# Patient Record
Sex: FEMALE | Race: BLACK OR AFRICAN AMERICAN | NOT HISPANIC OR LATINO | Employment: FULL TIME | URBAN - METROPOLITAN AREA
[De-identification: names, ages, dates, MRNs, and addresses within clinical notes are randomized per-mention and may not be internally consistent; named-entity substitution may affect disease eponyms.]

---

## 2023-04-24 PROBLEM — E78.5 DYSLIPIDEMIA: Status: ACTIVE | Noted: 2023-04-24

## 2023-04-24 PROBLEM — E55.9 VITAMIN D DEFICIENCY: Status: ACTIVE | Noted: 2023-04-24

## 2023-04-24 PROBLEM — M75.81 RIGHT ROTATOR CUFF TENDINITIS: Status: ACTIVE | Noted: 2023-04-24

## 2023-04-24 PROBLEM — R00.1 BRADYCARDIA: Status: ACTIVE | Noted: 2023-04-24

## 2023-04-24 PROBLEM — M85.80 OSTEOPENIA: Status: ACTIVE | Noted: 2023-04-24

## 2023-04-24 RX ORDER — ASPIRIN 325 MG
1 TABLET, DELAYED RELEASE (ENTERIC COATED) ORAL
COMMUNITY
Start: 2016-03-29

## 2023-04-24 NOTE — PROGRESS NOTES
Subjective   Naya Steiner is a 67 y.o. female history of dyslipidemia osteopenia vitamin D deficiency who presents for No chief complaint on file..  HPI  Patient is here for follow-up fasting well voices no major medical pain denies chest pain shortness of breath fever chills nausea vomiting,  Review of Systems  10 system review pertinent as above  Objective     Visit Vitals  /80   Pulse 74   Temp 36.4 °C (97.5 °F)   Resp 16      Physical Exam  HEENT: Atraumatic normocephalic the pupils are equal and round and reactive to light the sclerae nonicteric extraocular motion are intact.  Neck: Is supple without JVD no carotid bruits the trachea is midline there are no masses pulses are equal and bilateral with normal upstroke.  Skin: Normal.  Skin good texture.  Moist.  Good turgor.  No lesions, no rashes.  Lymph: No lymphadenopathy appreciated, no masses, no lesions  Lungs: Are clear to auscultation and percussion, good breath sounds bilaterally, no rhonchi, no wheezing, good diaphragmatic excursion.  Heart: Normal rate and normal rhythm S1, S2, no S3, no gallop, murmur or rub.  Abdomen: Soft, nontender, no organomegaly, good bowel sounds.    Extremities: Full range of motion, good pulses bilateral.  No cyanosis, no clubbing or edema.  Neuro: Cranial nerves II-XII are grossly intact there is no sensory or motor deficits.  Able to move all extremities.      Assessment/Plan     Patient is here for follow-up, fasting blood work    CBC BMP lipids AST ALT vitamin D 25-hydroxy    Continue with the low-fat, low-cholesterol diet,  I recommended Mediterranean diet, which include fish, chicken, vegetables and olive oil  Exercise daily for 30 minutes at least 3 times a week  Continue home medications    Tobacco abuse   Discussed smoking secession    Cardiac calcium   Stop smoking   Diet exercise    Vitamin D deficiency  D3 2000 units daily    History of osteopenia  Vitamin D calcium  Regular exercise  Problem List Items  Addressed This Visit          Circulatory    Bradycardia - Primary       Musculoskeletal    Right rotator cuff tendinitis    Osteopenia       Endocrine/Metabolic    Vitamin D deficiency       Other    Dyslipidemia     Other Visit Diagnoses       Screening mammogram, encounter for        Relevant Orders    BI mammo left diagnostic              Trell Sprague MD

## 2023-04-26 ENCOUNTER — OFFICE VISIT (OUTPATIENT)
Dept: PRIMARY CARE | Facility: CLINIC | Age: 68
End: 2023-04-26
Payer: COMMERCIAL

## 2023-04-26 VITALS
SYSTOLIC BLOOD PRESSURE: 122 MMHG | TEMPERATURE: 97.5 F | HEIGHT: 61 IN | RESPIRATION RATE: 16 BRPM | DIASTOLIC BLOOD PRESSURE: 80 MMHG | BODY MASS INDEX: 27.94 KG/M2 | HEART RATE: 74 BPM | WEIGHT: 148 LBS

## 2023-04-26 DIAGNOSIS — Z12.31 SCREENING MAMMOGRAM, ENCOUNTER FOR: ICD-10-CM

## 2023-04-26 DIAGNOSIS — M75.81 RIGHT ROTATOR CUFF TENDINITIS: ICD-10-CM

## 2023-04-26 DIAGNOSIS — E55.9 VITAMIN D DEFICIENCY: ICD-10-CM

## 2023-04-26 DIAGNOSIS — M85.80 OSTEOPENIA, UNSPECIFIED LOCATION: ICD-10-CM

## 2023-04-26 DIAGNOSIS — E78.5 DYSLIPIDEMIA: ICD-10-CM

## 2023-04-26 DIAGNOSIS — Z72.0 TOBACCO ABUSE: ICD-10-CM

## 2023-04-26 DIAGNOSIS — R00.1 BRADYCARDIA: Primary | ICD-10-CM

## 2023-04-26 PROCEDURE — 1159F MED LIST DOCD IN RCRD: CPT | Performed by: INTERNAL MEDICINE

## 2023-04-26 PROCEDURE — 80061 LIPID PANEL: CPT | Performed by: INTERNAL MEDICINE

## 2023-04-26 PROCEDURE — 99214 OFFICE O/P EST MOD 30 MIN: CPT | Performed by: INTERNAL MEDICINE

## 2023-04-26 PROCEDURE — 4004F PT TOBACCO SCREEN RCVD TLK: CPT | Performed by: INTERNAL MEDICINE

## 2023-04-26 PROCEDURE — 84460 ALANINE AMINO (ALT) (SGPT): CPT | Performed by: INTERNAL MEDICINE

## 2023-04-26 PROCEDURE — 85025 COMPLETE CBC W/AUTO DIFF WBC: CPT | Performed by: INTERNAL MEDICINE

## 2023-04-26 PROCEDURE — 84450 TRANSFERASE (AST) (SGOT): CPT | Performed by: INTERNAL MEDICINE

## 2023-04-26 PROCEDURE — 82306 VITAMIN D 25 HYDROXY: CPT | Performed by: INTERNAL MEDICINE

## 2023-04-26 PROCEDURE — 80048 BASIC METABOLIC PNL TOTAL CA: CPT | Performed by: INTERNAL MEDICINE

## 2023-04-26 ASSESSMENT — PAIN SCALES - GENERAL: PAINLEVEL: 0-NO PAIN

## 2023-04-28 DIAGNOSIS — Z72.0 TOBACCO ABUSE: ICD-10-CM

## 2023-04-28 DIAGNOSIS — E78.5 DYSLIPIDEMIA: Primary | ICD-10-CM

## 2023-04-28 RX ORDER — ROSUVASTATIN CALCIUM 20 MG/1
20 TABLET, COATED ORAL DAILY
Qty: 30 TABLET | Refills: 5 | Status: SHIPPED | OUTPATIENT
Start: 2023-04-28 | End: 2024-03-13 | Stop reason: SDUPTHER

## 2023-05-08 ENCOUNTER — TELEPHONE (OUTPATIENT)
Dept: PRIMARY CARE | Facility: CLINIC | Age: 68
End: 2023-05-08
Payer: COMMERCIAL

## 2024-03-07 NOTE — PROGRESS NOTES
Subjective   Naya Steiner is a 68 y.o. female  who presents for follow-up and Medicare wellness.  HPI  Sola is 68 numbness of dyslipidemia osteopenia vitamin D insufficiency patient is here for follow-up fasting well voices no major medical pain denies chest pain shortness of breath fever chills nausea vomiting, patient is here for Medicare wellness as well as follow-up exam, she was no major medical manage takes medication prescribed voices no major minor complaint denies chest pain shortness of breath fever chills nausea vomiting constipation diarrhea this urgency frequency.  Review of Systems  10 system review pertinent as above  Objective     Visit Vitals  /80   Pulse 78   Temp 36.6 °C (97.9 °F)   Resp 16      Physical Exam  HEENT: Atraumatic normocephalic the pupils are equal and round and reactive to light the sclerae nonicteric extraocular motion are intact.  Neck: Is supple without JVD no carotid bruits the trachea is midline there are no masses pulses are equal and bilateral with normal upstroke.  Skin: Normal.  Skin good texture.  Moist.  Good turgor.  No lesions, no rashes.  Lymph: No lymphadenopathy appreciated, no masses, no lesions  Lungs: Are clear to auscultation and percussion, good breath sounds bilaterally, no rhonchi, no wheezing, good diaphragmatic excursion.  Heart: Normal rate and normal rhythm S1, S2, no S3, no gallop, murmur or rub.  Abdomen: Soft, nontender, no organomegaly, good bowel sounds.    Extremities: Full range of motion, good pulses bilateral.  No cyanosis, no clubbing or edema.  Neuro: Cranial nerves II-XII are grossly intact there is no sensory or motor deficits.  Able to move all extremities.      Assessment/Plan     Patient is here for follow-up, fasting blood work    CBC BMP lipids AST ALT vitamin D 25-hydroxy    Immunization  Flu declined  Pneumovax Declined  Corona Declined    Colonoscopy  06/06/2022  Mammogram Req given  03/13/2024  DEXA  CACS Score 04/2022  LM  65.96  LAD 44.5 v    Cardiology Referral  Dr Jordy montenegro    Continue with the low-fat, low-cholesterol diet,  I recommended Mediterranean diet, which include fish, chicken, vegetables and olive oil  Exercise daily for 30 minutes at least 3 times a week  Continue home medications  Rosuvastatin does not take  I spoke with patient about restarting    Tobacco abuse   Discussed smoking secession  Patient is high risk both of Lung cancer and CAD  Shared decision made patient is ready to proceed   With low dose CT chest    Vitamin D deficiency  D3 2000 units daily    History of osteopenia  Vitamin D calcium  Regular exercise  Problem List Items Addressed This Visit       Dyslipidemia    Relevant Orders    Referral to Cardiology    Basic Metabolic Panel    Lipid Panel    AST    ALT     Other Visit Diagnoses       Screening mammogram for breast cancer    -  Primary    Relevant Orders    BI mammo bilateral screening tomosynthesis    Basic Metabolic Panel    Elevated coronary artery calcium score        Relevant Orders    Referral to Cardiology    CBC    Tobacco abuse        Relevant Orders    Referral to Cardiology    CBC    Basic Metabolic Panel    Vitamin D insufficiency        Relevant Orders    Vitamin D 25-Hydroxy,Total (for eval of Vitamin D levels)            Trell Sprague MD

## 2024-03-13 ENCOUNTER — OFFICE VISIT (OUTPATIENT)
Dept: PRIMARY CARE | Facility: CLINIC | Age: 69
End: 2024-03-13
Payer: MEDICARE

## 2024-03-13 VITALS
BODY MASS INDEX: 27.38 KG/M2 | HEIGHT: 61 IN | SYSTOLIC BLOOD PRESSURE: 122 MMHG | TEMPERATURE: 97.9 F | WEIGHT: 145 LBS | HEART RATE: 78 BPM | DIASTOLIC BLOOD PRESSURE: 80 MMHG | RESPIRATION RATE: 16 BRPM

## 2024-03-13 DIAGNOSIS — Z12.2 SCREENING FOR LUNG CANCER: ICD-10-CM

## 2024-03-13 DIAGNOSIS — F17.210 CIGARETTE SMOKER: ICD-10-CM

## 2024-03-13 DIAGNOSIS — E55.9 VITAMIN D INSUFFICIENCY: ICD-10-CM

## 2024-03-13 DIAGNOSIS — Z72.0 TOBACCO ABUSE: ICD-10-CM

## 2024-03-13 DIAGNOSIS — Z00.00 ROUTINE GENERAL MEDICAL EXAMINATION AT HEALTH CARE FACILITY: ICD-10-CM

## 2024-03-13 DIAGNOSIS — E78.5 DYSLIPIDEMIA: ICD-10-CM

## 2024-03-13 DIAGNOSIS — R93.1 ELEVATED CORONARY ARTERY CALCIUM SCORE: ICD-10-CM

## 2024-03-13 DIAGNOSIS — Z12.31 SCREENING MAMMOGRAM FOR BREAST CANCER: Primary | ICD-10-CM

## 2024-03-13 PROCEDURE — 99214 OFFICE O/P EST MOD 30 MIN: CPT | Performed by: INTERNAL MEDICINE

## 2024-03-13 PROCEDURE — G0438 PPPS, INITIAL VISIT: HCPCS | Performed by: INTERNAL MEDICINE

## 2024-03-13 PROCEDURE — 84450 TRANSFERASE (AST) (SGOT): CPT | Performed by: INTERNAL MEDICINE

## 2024-03-13 PROCEDURE — 1126F AMNT PAIN NOTED NONE PRSNT: CPT | Performed by: INTERNAL MEDICINE

## 2024-03-13 PROCEDURE — 85025 COMPLETE CBC W/AUTO DIFF WBC: CPT | Performed by: INTERNAL MEDICINE

## 2024-03-13 PROCEDURE — 1159F MED LIST DOCD IN RCRD: CPT | Performed by: INTERNAL MEDICINE

## 2024-03-13 PROCEDURE — 82306 VITAMIN D 25 HYDROXY: CPT | Performed by: INTERNAL MEDICINE

## 2024-03-13 PROCEDURE — 84460 ALANINE AMINO (ALT) (SGPT): CPT | Performed by: INTERNAL MEDICINE

## 2024-03-13 PROCEDURE — 80061 LIPID PANEL: CPT | Performed by: INTERNAL MEDICINE

## 2024-03-13 PROCEDURE — 4004F PT TOBACCO SCREEN RCVD TLK: CPT | Performed by: INTERNAL MEDICINE

## 2024-03-13 PROCEDURE — 1170F FXNL STATUS ASSESSED: CPT | Performed by: INTERNAL MEDICINE

## 2024-03-13 PROCEDURE — 80048 BASIC METABOLIC PNL TOTAL CA: CPT | Performed by: INTERNAL MEDICINE

## 2024-03-13 RX ORDER — ROSUVASTATIN CALCIUM 20 MG/1
20 TABLET, COATED ORAL DAILY
Qty: 30 TABLET | Refills: 5 | Status: SHIPPED | OUTPATIENT
Start: 2024-03-13 | End: 2024-09-09

## 2024-03-13 RX ORDER — ASPIRIN 81 MG/1
81 TABLET ORAL DAILY
Qty: 30 TABLET | Refills: 11
Start: 2024-03-13 | End: 2025-03-13

## 2024-03-13 ASSESSMENT — ACTIVITIES OF DAILY LIVING (ADL)
GROCERY SHOPPING: INDEPENDENT
GROOMING: INDEPENDENT
HEARING - RIGHT EAR: FUNCTIONAL
NEEDS ASSISTANCE WITH FOOD: INDEPENDENT
MANAGING FINANCES: INDEPENDENT
TAKING MEDICATION: INDEPENDENT
ADEQUATE_TO_COMPLETE_ADL: YES
BATHING: INDEPENDENT
PREPARING MEALS: INDEPENDENT
USING TELEPHONE: INDEPENDENT
DOING HOUSEWORK: INDEPENDENT
HEARING - LEFT EAR: FUNCTIONAL
STIL DRIVING: YES
USING TRANSPORTATION: INDEPENDENT
EATING: INDEPENDENT
TOILETING: INDEPENDENT
JUDGMENT_ADEQUATE_SAFELY_COMPLETE_DAILY_ACTIVITIES: YES
DRESSING YOURSELF: INDEPENDENT
PILL BOX USED: NO
PATIENT'S MEMORY ADEQUATE TO SAFELY COMPLETE DAILY ACTIVITIES?: YES
FEEDING YOURSELF: INDEPENDENT
WALKS IN HOME: INDEPENDENT

## 2024-03-13 ASSESSMENT — ANXIETY QUESTIONNAIRES
2. NOT BEING ABLE TO STOP OR CONTROL WORRYING: NOT AT ALL
3. WORRYING TOO MUCH ABOUT DIFFERENT THINGS: NOT AT ALL
IF YOU CHECKED OFF ANY PROBLEMS ON THIS QUESTIONNAIRE, HOW DIFFICULT HAVE THESE PROBLEMS MADE IT FOR YOU TO DO YOUR WORK, TAKE CARE OF THINGS AT HOME, OR GET ALONG WITH OTHER PEOPLE: NOT DIFFICULT AT ALL
6. BECOMING EASILY ANNOYED OR IRRITABLE: NOT AT ALL
GAD7 TOTAL SCORE: 0
4. TROUBLE RELAXING: NOT AT ALL
7. FEELING AFRAID AS IF SOMETHING AWFUL MIGHT HAPPEN: NOT AT ALL
5. BEING SO RESTLESS THAT IT IS HARD TO SIT STILL: NOT AT ALL
1. FEELING NERVOUS, ANXIOUS, OR ON EDGE: NOT AT ALL

## 2024-03-13 ASSESSMENT — PATIENT HEALTH QUESTIONNAIRE - PHQ9
SUM OF ALL RESPONSES TO PHQ9 QUESTIONS 1 AND 2: 0
2. FEELING DOWN, DEPRESSED OR HOPELESS: NOT AT ALL
1. LITTLE INTEREST OR PLEASURE IN DOING THINGS: NOT AT ALL

## 2024-03-13 ASSESSMENT — PAIN SCALES - GENERAL: PAINLEVEL: 0-NO PAIN

## 2024-03-13 ASSESSMENT — COLUMBIA-SUICIDE SEVERITY RATING SCALE - C-SSRS
1. IN THE PAST MONTH, HAVE YOU WISHED YOU WERE DEAD OR WISHED YOU COULD GO TO SLEEP AND NOT WAKE UP?: NO
2. HAVE YOU ACTUALLY HAD ANY THOUGHTS OF KILLING YOURSELF?: NO
6. HAVE YOU EVER DONE ANYTHING, STARTED TO DO ANYTHING, OR PREPARED TO DO ANYTHING TO END YOUR LIFE?: NO

## 2024-03-13 ASSESSMENT — GERIATRIC MINI NUTRITIONAL ASSESSMENT (MNA)
C GENERAL MOBILITY: GOES OUT
B WEIGHT LOSS DURING THE LAST 3 MONTHS: NO WEIGHT LOSS
A HAS FOOD INTAKE DECLINED OVER THE PAST 3 MONTHS DUE TO LOSS OF APPETITE, DIGESTIVE PROBLEMS, CHEWING OR SWALLOWING DIFFICULTIES?: NO DECREASE IN FOOD INTAKE

## 2024-03-13 ASSESSMENT — ENCOUNTER SYMPTOMS
LOSS OF SENSATION IN FEET: 0
OCCASIONAL FEELINGS OF UNSTEADINESS: 0
DEPRESSION: 0

## 2024-03-13 NOTE — PROGRESS NOTES
"Subjective   Reason for Visit: Naya Steiner is an 68 y.o. female here for a Medicare Wellness visit. Reviewed all medications by prescribing practitioner or clinical pharmacist (such as prescriptions, OTCs, herbal therapies and supplements) and documented in the medical record.    HPI  Naya 68 female with a history of tobacco abuse, osteopenia patient is here for Medicare wellness she lives independently at home voices no complaints she takes her medication as prescribed reports no falling.  Patient Care Team:  Trell Sprague MD as PCP - General  Trell Sprague MD as PCP - [employer] PCP  Trell Sprague MD [facility] PCP - [facility]     Review of Systems  10 system are pertinent as above  Objective   Vitals:    /80   Pulse 78   Temp 36.6 °C (97.9 °F)   Resp 16   Ht 1.549 m (5' 1\")   Wt 65.8 kg (145 lb)   BMI 27.40 kg/m²       Physical Exam  HEENT: Atraumatic normocephalic the pupils are equal and round and reactive to light the sclerae nonicteric extraocular motion are intact.  Neck: Is supple without JVD no carotid bruits the trachea is midline there are no masses pulses are equal and bilateral with normal upstroke.  Skin: Normal.  Skin good texture.  Moist.  Good turgor.  No lesions, no rashes.  Lymph: No lymphadenopathy appreciated, no masses, no lesions  Lungs: Are clear to auscultation and percussion, good breath sounds bilaterally, no rhonchi, no wheezing, good diaphragmatic excursion.  Heart: Normal rate and normal rhythm S1, S2, no S3, no gallop, murmur or rub.  Abdomen: Soft, nontender, no organomegaly, good bowel sounds.    Extremities: Full range of motion, good pulses bilateral.  No cyanosis, no clubbing or edema.  Neuro: Cranial nerves II-XII are grossly intact there is no sensory or motor deficits.  Able to move all extremities.  Assessment/Plan   Acute wellness  Problem List Items Addressed This Visit       Vitamin D insufficiency    Relevant Orders    Vitamin D 25-Hydroxy,Total " (for eval of Vitamin D levels)    Dyslipidemia    Relevant Medications    rosuvastatin (Crestor) 20 mg tablet    Other Relevant Orders    Referral to Cardiology    Basic Metabolic Panel    Lipid Panel    AST    ALT    Tobacco abuse    Relevant Orders    Referral to Cardiology    CBC    Basic Metabolic Panel    CT lung screening low dose    Screening mammogram for breast cancer - Primary    Relevant Orders    BI mammo bilateral screening tomosynthesis    Basic Metabolic Panel    CT lung screening low dose    Cigarette smoker    Relevant Orders    CT lung screening low dose    Elevated coronary artery calcium score    Relevant Medications    aspirin 81 mg EC tablet    Other Relevant Orders    Referral to Cardiology    CBC

## 2024-04-02 ENCOUNTER — HOSPITAL ENCOUNTER (OUTPATIENT)
Dept: RADIOLOGY | Facility: HOSPITAL | Age: 69
Discharge: HOME | End: 2024-04-02
Payer: MEDICARE

## 2024-04-02 DIAGNOSIS — Z12.31 SCREENING MAMMOGRAM FOR BREAST CANCER: ICD-10-CM

## 2024-04-02 DIAGNOSIS — Z12.2 SCREENING FOR LUNG CANCER: ICD-10-CM

## 2024-04-02 DIAGNOSIS — Z72.0 TOBACCO ABUSE: ICD-10-CM

## 2024-04-02 DIAGNOSIS — F17.210 CIGARETTE SMOKER: ICD-10-CM

## 2024-04-02 PROCEDURE — 71271 CT THORAX LUNG CANCER SCR C-: CPT

## 2024-04-15 PROBLEM — I10 BENIGN ESSENTIAL HYPERTENSION: Status: ACTIVE | Noted: 2024-04-15

## 2024-04-16 ENCOUNTER — OFFICE VISIT (OUTPATIENT)
Dept: CARDIOLOGY | Facility: CLINIC | Age: 69
End: 2024-04-16
Payer: MEDICARE

## 2024-04-16 VITALS
BODY MASS INDEX: 28.05 KG/M2 | WEIGHT: 148.6 LBS | HEIGHT: 61 IN | OXYGEN SATURATION: 96 % | HEART RATE: 48 BPM | SYSTOLIC BLOOD PRESSURE: 157 MMHG | DIASTOLIC BLOOD PRESSURE: 76 MMHG

## 2024-04-16 DIAGNOSIS — Z72.0 TOBACCO ABUSE: ICD-10-CM

## 2024-04-16 DIAGNOSIS — E78.5 DYSLIPIDEMIA: ICD-10-CM

## 2024-04-16 DIAGNOSIS — R06.09 DOE (DYSPNEA ON EXERTION): ICD-10-CM

## 2024-04-16 DIAGNOSIS — R93.1 ELEVATED CORONARY ARTERY CALCIUM SCORE: Primary | ICD-10-CM

## 2024-04-16 PROCEDURE — 93005 ELECTROCARDIOGRAM TRACING: CPT | Performed by: INTERNAL MEDICINE

## 2024-04-16 PROCEDURE — 99214 OFFICE O/P EST MOD 30 MIN: CPT | Mod: 25 | Performed by: INTERNAL MEDICINE

## 2024-04-16 RX ORDER — CHOLECALCIFEROL (VITAMIN D3) 50 MCG
50 TABLET ORAL EVERY OTHER DAY
COMMUNITY

## 2024-04-16 ASSESSMENT — ENCOUNTER SYMPTOMS
OCCASIONAL FEELINGS OF UNSTEADINESS: 0
LOSS OF SENSATION IN FEET: 0

## 2024-04-16 ASSESSMENT — COLUMBIA-SUICIDE SEVERITY RATING SCALE - C-SSRS
6. HAVE YOU EVER DONE ANYTHING, STARTED TO DO ANYTHING, OR PREPARED TO DO ANYTHING TO END YOUR LIFE?: NO
2. HAVE YOU ACTUALLY HAD ANY THOUGHTS OF KILLING YOURSELF?: NO
1. IN THE PAST MONTH, HAVE YOU WISHED YOU WERE DEAD OR WISHED YOU COULD GO TO SLEEP AND NOT WAKE UP?: NO

## 2024-04-16 ASSESSMENT — PATIENT HEALTH QUESTIONNAIRE - PHQ9
1. LITTLE INTEREST OR PLEASURE IN DOING THINGS: NOT AT ALL
2. FEELING DOWN, DEPRESSED OR HOPELESS: NOT AT ALL
SUM OF ALL RESPONSES TO PHQ9 QUESTIONS 1 AND 2: 0

## 2024-04-16 ASSESSMENT — PAIN SCALES - GENERAL: PAINLEVEL: 0-NO PAIN

## 2024-04-16 NOTE — PROGRESS NOTES
Primary Care Physician: Trell Sprague MD  Date of Visit: 04/16/2024 10:00 AM EDT  [place] [facility] [place] [address]     Chief Complaint:   No chief complaint on file.       HPI / Summary:   Naya Steiner is a 68 y.o. female presents new CV evaluation.  Referred Dr. Trell Sprague  April 2022 calcium score total of 110, left main 66, LAD 44.5    Still smoking, menthol ppd.    Etoh wine 4-6 glasses a week.  Worked at [employer], then [employer] as a .  Retired  Sx s/p hysterectomy.  No h/o lung liver, kidney disease.  No h/o blood clat, ca.    Family hx, Br 44 MI, M 62 MI.    2 pregnancies, 2 kids  No CP, no h/o MI, CVA.    Not taking statin.  Denies symptoms to suggest angina, heart failure endorses some exertional dyspnea denies claudication  Specialty Problems          Cardiology Problems    Bradycardia    Dyslipidemia    Cigarette smoker    Elevated coronary artery calcium score    Tobacco abuse    Benign essential hypertension     Comment on above: Added by Problem List Migration; 2013-12-13;             Past Medical History:   Diagnosis Date    Bradycardia, unspecified 05/09/2016    Bradycardia    Other conditions influencing health status 03/29/2016    Colonoscopy planned    Other shoulder lesions, right shoulder 12/21/2017    Right rotator cuff tendinitis    Personal history of other specified conditions 06/28/2016    History of fatigue    Tobacco abuse counseling 03/29/2016    Tobacco abuse counseling          No past surgical history on file.       Social History:   reports that she has been smoking cigarettes. She has been exposed to tobacco smoke. She has never used smokeless tobacco. She reports current alcohol use. She reports that she does not use drugs.      Allergies:  No Known Allergies    Outpatient Medications:  Current Outpatient Medications   Medication Instructions    aspirin 81 mg, oral, Daily    cholecalciferol (Vitamin D-3) 1,250 mcg (50,000 unit) capsule 1 capsule,  "oral, Once Weekly    rosuvastatin (CRESTOR) 20 mg, oral, Daily       ROS     Physical Exam:  There were no vitals filed for this visit.  Wt Readings from Last 5 Encounters:   03/13/24 65.8 kg (145 lb)   04/26/23 67.1 kg (148 lb)   01/25/23 68.9 kg (152 lb)   11/30/22 70.8 kg (156 lb)   04/07/22 68.9 kg (152 lb)     There is no height or weight on file to calculate BMI.     Well-developed well-nourished female in no acute distress  .  Flat .  Normal carotid upstrokes no bruits.  Prominent carotid pulsation the right neck flat JVP  Regular rate and rhythm without gallop or murmur.  Somewhat diminished apical breath sounds coarse breath sounds at the bases seem mildly hyperinflated.  Abdomen was soft and nontender without masses aneurysms or bruits.  No dependent edema with intact pedal pulses  Last Labs:  CMP:No results for input(s): \"NA\", \"K\", \"CL\", \"CO2\", \"ANIONGAP\", \"BUN\", \"CREATININE\", \"EGFR\", \"GLUCOSE\" in the last 95460 hours.No results for input(s): \"ALBUMIN\", \"ALKPHOS\", \"ALT\", \"AST\", \"BILITOT\", \"LIPASE\" in the last 43802 hours.    No lab exists for component: \"CA\"  CBC:No results for input(s): \"WBC\", \"HGB\", \"HCT\", \"PLT\", \"MCV\" in the last 38895 hours.  COAG: No results for input(s): \"INR\", \"DDIMERVTE\" in the last 52015 hours.  HEME/ENDO:  Recent Labs     09/30/21  0824   HGBA1C 5.8*      CARDIAC: No results for input(s): \"LDH\", \"CKMB\", \"TROPHS\", \"BNP\" in the last 05565 hours.    No lab exists for component: \"CK\", \"CKMBP\"No results for input(s): \"CHOL\", \"LDLF\", \"HDL\", \"TRIG\" in the last 36302 hours.    Last Cardiology Tests:  ECG:  ECG: Marked sinus bradycardia no pathological Q waves no acute ST or T wave changes    Echo:  Echo Results:  No results found for this or any previous visit from the past 3650 days.       Cath:      Stress Test:  Stress Results:  No results found for this or any previous visit from the past 365 days.         Cardiac Imaging:  CT lung screening low dose  Narrative: Interpreted By:  " Minor Christine,   STUDY:  CT LUNG SCREENING LOW DOSE;  4/2/2024 10:48 am      INDICATION:  Signs/Symptoms:smoker.      COMPARISON:  None.      ACCESSION NUMBER(S):  [id number]      ORDERING CLINICIAN:  BARBRA SHEA      TECHNIQUE:  Helical data acquisition of the chest was obtained without IV  contrast material.  Images were reformatted in axial, coronal, and  sagittal planes.      FINDINGS:  LUNGS AND AIRWAYS:  The trachea and central airways are patent. No endobronchial lesion  is seen.      There is mild subpleural reticulation with centrilobular ground-glass  opacities consistent with a component of respiratory bronchiolitis.  There is no focal consolidation, pleural effusion, or pneumothorax.      There is a 2 mm subpleural lingular lung nodule.  There are no suspicious parenchymal lung nodules.      MEDIASTINUM AND MALATHI, LOWER NECK AND AXILLA:  The visualized thyroid gland is within normal limits.  There are scattered mediastinal lymph nodes are felt to be  reactive/inflammatory in nature. Esophagus appears within normal  limits as seen.      HEART AND VESSELS:  There is mild atherosclerotic changes of the thoracic aorta.  Main pulmonary artery and its branches are normal in caliber.  Moderate coronary artery calcifications are seen. Please note,the  study is not optimized for evaluation of coronary arteries estimated  CT calcium score: 149 The cardiac chambers are not enlarged.  There is no pericardial effusion seen.      UPPER ABDOMEN:  The visualized subdiaphragmatic structures demonstrate no remarkable  findings.              CHEST WALL AND OSSEOUS STRUCTURES:  Chest wall is within normal limits.  No acute osseous pathology.There are no suspicious osseous  lesions.Macroscopic right breast calcifications benign in appearance.      Impression: 1. There are no suspicious parenchymal lung nodules. Recommend a 1  year low-dose chest CT for surveillance.  2. Moderate coronary artery calcifications and  correlate with  coronary artery disease risk factors.          LUNG RADS CATEGORY:  Lung Rad: Lung-RADS 2 (Benign Appearance or Indolent Behavior)      Recommendation: Continue annual screening with Low Dose Chest CT in  12 months, recommended as per American College of Radiology  Guidelines Lung-RADS Version 2022.          MACRO:  None      Signed by: Minor Christine 4/2/2024 10:20 PM  Dictation workstation:   [id number]        Assessment/Plan     Tobacco smoker, dyslipidemia, very concerning family history of accelerated ASCVD, mild exertional dyspnea, elevated calcium score 2 years ago.  We had a somewhat spirited discussion with regard to her atherosclerotic disease risk.  I was adamant and telling her that she should definitely stop smoking and take her statin as prescribed emphasizing the benefits of both interventions.  She voices understanding but deep skepticism.  I tried to answer her questions.  I would suggest a stress echo to evaluate her coronary reserve.  I hope in the future she will make better lifestyle and medication choices.  Thank you for your referral      Orders:  No orders of the defined types were placed in this encounter.     Followup Appts:  Future Appointments   Date Time Provider Department Center   5[facility]3/2024 10:15 AM [facility] Rad           ____________________________________________________________  Jordy Padron MD    Senior Attending Physician  [facility] [place]  [facility]

## 2024-04-24 ENCOUNTER — APPOINTMENT (OUTPATIENT)
Dept: CARDIOLOGY | Facility: CLINIC | Age: 69
End: 2024-04-24
Payer: MEDICARE

## 2024-04-24 ENCOUNTER — HOSPITAL ENCOUNTER (OUTPATIENT)
Dept: CARDIOLOGY | Facility: CLINIC | Age: 69
Discharge: HOME | End: 2024-04-24
Payer: MEDICARE

## 2024-04-24 DIAGNOSIS — R06.09 DOE (DYSPNEA ON EXERTION): ICD-10-CM

## 2024-04-24 DIAGNOSIS — Z72.0 TOBACCO ABUSE: ICD-10-CM

## 2024-04-24 DIAGNOSIS — E78.5 DYSLIPIDEMIA: ICD-10-CM

## 2024-04-24 DIAGNOSIS — R93.1 ELEVATED CORONARY ARTERY CALCIUM SCORE: ICD-10-CM

## 2024-04-24 PROCEDURE — 93016 CV STRESS TEST SUPVJ ONLY: CPT | Performed by: INTERNAL MEDICINE

## 2024-04-24 PROCEDURE — 93350 STRESS TTE ONLY: CPT | Performed by: INTERNAL MEDICINE

## 2024-04-24 PROCEDURE — 93321 DOPPLER ECHO F-UP/LMTD STD: CPT | Performed by: INTERNAL MEDICINE

## 2024-04-24 PROCEDURE — 93321 DOPPLER ECHO F-UP/LMTD STD: CPT

## 2024-04-24 PROCEDURE — 93018 CV STRESS TEST I&R ONLY: CPT | Performed by: INTERNAL MEDICINE

## 2024-04-25 ENCOUNTER — TELEPHONE (OUTPATIENT)
Dept: CARDIOLOGY | Facility: CLINIC | Age: 69
End: 2024-04-25
Payer: MEDICARE

## 2024-04-25 NOTE — TELEPHONE ENCOUNTER
----- Message from Jordy Padron MD sent at 4/25/2024  3:37 PM EDT -----  Let her know she passed her stress test

## 2024-04-26 LAB
ATRIAL RATE: 44 BPM
P AXIS: 29 DEGREES
P OFFSET: 190 MS
P ONSET: 139 MS
PR INTERVAL: 174 MS
Q ONSET: 226 MS
QRS COUNT: 8 BEATS
QRS DURATION: 104 MS
QT INTERVAL: 484 MS
QTC CALCULATION(BAZETT): 413 MS
QTC FREDERICIA: 436 MS
R AXIS: 17 DEGREES
T AXIS: 27 DEGREES
T OFFSET: 468 MS
VENTRICULAR RATE: 44 BPM

## 2024-05-03 ENCOUNTER — HOSPITAL ENCOUNTER (OUTPATIENT)
Dept: RADIOLOGY | Facility: CLINIC | Age: 69
Discharge: HOME | End: 2024-05-03
Payer: MEDICARE

## 2024-05-03 VITALS — BODY MASS INDEX: 27.78 KG/M2 | WEIGHT: 147 LBS

## 2024-05-03 DIAGNOSIS — Z12.31 SCREENING MAMMOGRAM FOR BREAST CANCER: ICD-10-CM

## 2024-05-03 PROCEDURE — 77063 BREAST TOMOSYNTHESIS BI: CPT | Performed by: STUDENT IN AN ORGANIZED HEALTH CARE EDUCATION/TRAINING PROGRAM

## 2024-05-03 PROCEDURE — 77067 SCR MAMMO BI INCL CAD: CPT | Performed by: STUDENT IN AN ORGANIZED HEALTH CARE EDUCATION/TRAINING PROGRAM

## 2024-05-03 PROCEDURE — 77067 SCR MAMMO BI INCL CAD: CPT

## 2024-09-18 ENCOUNTER — TELEMEDICINE (OUTPATIENT)
Dept: PRIMARY CARE | Facility: CLINIC | Age: 69
End: 2024-09-18
Payer: MEDICARE

## 2024-09-18 DIAGNOSIS — J20.9 ACUTE BRONCHITIS, UNSPECIFIED ORGANISM: Primary | ICD-10-CM

## 2024-09-18 DIAGNOSIS — R05.2 SUBACUTE COUGH: ICD-10-CM

## 2024-09-18 PROCEDURE — 99213 OFFICE O/P EST LOW 20 MIN: CPT | Performed by: INTERNAL MEDICINE

## 2024-09-18 RX ORDER — AZITHROMYCIN 250 MG/1
TABLET, FILM COATED ORAL
Qty: 6 TABLET | Refills: 0 | Status: SHIPPED | OUTPATIENT
Start: 2024-09-18 | End: 2024-09-23

## 2024-09-18 NOTE — PROGRESS NOTES
Subjective   Naya Steiner is a 68 y.o. female  who presents for a virtual visit.   HPI  Sola is 68 numbness of dyslipidemia osteopenia vitamin D insufficiency patient is here for virtual visit complaining of postnasal drip cough and shortness of breath concern that she may have bronchitis.  Patient denies fever chills admits to cough, productive yellowish mucus patient did not check for COVID  Review of Systems  10 system review pertinent as above  Objective   Virtual visit  There were no vitals taken for this visit.     Physical Exam  Virtual visit  Assessment/Plan     Virtual visit    Acute bronchitis  Suspect bacterial  Yellow-greenish discharge  Fluids and rest  Azithromycin as directed  Call if not better      Patient is here for follow-up, fasting blood work    CBC BMP lipids AST ALT vitamin D 25-hydroxy    Immunization  Flu declined  Pneumovax Declined  Corona Declined    Colonoscopy  06/06/2022  Mammogram Req given  03/13/2024  DEXA  CACS Score 04/2022  LM 65.96  LAD 44.5 v    Cardiology Referral  Dr Jordy montenegro    Continue with the low-fat, low-cholesterol diet,  I recommended Mediterranean diet, which include fish, chicken, vegetables and olive oil  Exercise daily for 30 minutes at least 3 times a week  Continue home medications  Rosuvastatin does not take  I spoke with patient about restarting    Tobacco abuse   Discussed smoking secession  Patient is high risk both of Lung cancer and CAD  Shared decision made patient is ready to proceed   With low dose CT chest    Vitamin D deficiency  D3 2000 units daily    History of osteopenia  Vitamin D calcium  Regular exercise  Problem List Items Addressed This Visit    None  Visit Diagnoses       Acute bronchitis, unspecified organism    -  Primary    Relevant Medications    azithromycin (Zithromax) 250 mg tablet            Trell Sprague MD

## 2025-07-30 NOTE — PROGRESS NOTES
Subjective   Naya Steiner is a 69 y.o. female  who presents for a follow-up.   HPI  Sola is 68 numbness of dyslipidemia osteopenia vitamin D insufficiency patient is here for virtual visit complaining of postnasal drip cough and shortness of breath concern that she may have bronchitis.  Patient denies fever chills admits to cough, productive yellowish mucus patient did not check for patient appetite is suppressed, patient is still smoking, and remains active working after [employer].  Her weight however is the same for the past year.   Review of Systems  10 system review pertinent as above  Objective     Visit Vitals  /82   Pulse 78   Temp 36.5 °C (97.7 °F)   Resp 16        Physical Exam  HEENT: Atraumatic normocephalic the pupils are equal and round and reactive to light the sclerae nonicteric extraocular motion are intact.  Neck: Is supple without JVD no carotid bruits the trachea is midline there are no masses pulses are equal and bilateral with normal upstroke.  Skin: Normal.  Skin good texture.  Moist.  Good turgor.  No lesions, no rashes.  Lymph: No lymphadenopathy appreciated, no masses, no lesions  Lungs: Are clear to auscultation and percussion, good breath sounds bilaterally, no rhonchi, no wheezing, good diaphragmatic excursion.  Heart: Normal rate and normal rhythm S1, S2, no S3, no gallop, murmur or rub.  Abdomen: Soft, nontender, no organomegaly, good bowel sounds.    Extremities: Full range of motion, good pulses bilateral.  No cyanosis, no clubbing or edema.  Neuro: Cranial nerves II-XII are grossly intact there is no sensory or motor deficits.  Able to move all extremities.  Assessment/Plan     Patient is here for follow-up, fasting blood work    CBC BMP lipids AST ALT vitamin D 25-hydroxy    Immunization  Flu declined  Pneumovax Declined  Corona Declined    Colonoscopy  06/06/2022  Mammogram Req given  03/13/2024  DEXA declined  CACS Score 04/2022  LM 65.96  LAD 44.5 v    Cardiology  Referral  Dr Jordy montenegro    Continue with the low-fat, low-cholesterol diet,  I recommended Mediterranean diet, which include fish, chicken, vegetables and olive oil  Exercise daily for 30 minutes at least 3 times a week  Continue home medications  Rosuvastatin does not take  I spoke with patient about restarting  Patient declined statins  She is committed to stop smoking and  Take Fish oil and Red yeast rice    Tobacco abuse   Discussed smoking secession  Patient is high risk both of Lung cancer and CAD  Shared decision made patient is ready to proceed   Neg low dose chest ct  Consulted with pat Megan smoking secession      Vitamin D deficiency  D3 2000 units daily    History of osteopenia  Vitamin D calcium  Regular exercise  Problem List Items Addressed This Visit       Vitamin D insufficiency      Orders:    Vitamin D 25-Hydroxy,Total (for eval of Vitamin D levels)           Relevant Orders    Vitamin D 25-Hydroxy,Total (for eval of Vitamin D levels)    Dyslipidemia      Orders:    Lipid Panel    Alanine Aminotransferase    Aspartate Aminotransferase    CBC w/5 Part Differential, [facility]           Relevant Orders    Lipid Panel    Alanine Aminotransferase    Aspartate Aminotransferase    CBC w/5 Part Differential, [facility]    Bradycardia      Orders:    CBC w/5 Part Differential, [facility]           Relevant Orders    CBC w/5 Part Differential, [facility]    Routine general medical examination at health care facility - Primary      Orders:    BI mammo bilateral screening tomosynthesis; Future           Relevant Orders    1 Year Follow Up In Primary Care - Wellness Exam    Elevated coronary artery calcium score                  Benign essential hypertension      Orders:    Basic Metabolic Panel    CBC w/5 Part Differential, [facility]           Relevant Orders    Basic Metabolic Panel    CBC w/5 Part Differential, [facility]         Trell Sprague MD

## 2025-08-05 ENCOUNTER — APPOINTMENT (OUTPATIENT)
Dept: PRIMARY CARE | Facility: CLINIC | Age: 70
End: 2025-08-05
Payer: MEDICARE

## 2025-08-05 VITALS
SYSTOLIC BLOOD PRESSURE: 136 MMHG | HEIGHT: 61 IN | DIASTOLIC BLOOD PRESSURE: 82 MMHG | HEART RATE: 78 BPM | BODY MASS INDEX: 27.19 KG/M2 | RESPIRATION RATE: 16 BRPM | TEMPERATURE: 97.7 F | WEIGHT: 144 LBS

## 2025-08-05 DIAGNOSIS — I10 BENIGN ESSENTIAL HYPERTENSION: ICD-10-CM

## 2025-08-05 DIAGNOSIS — E78.5 DYSLIPIDEMIA: ICD-10-CM

## 2025-08-05 DIAGNOSIS — Z12.31 ENCOUNTER FOR SCREENING MAMMOGRAM FOR MALIGNANT NEOPLASM OF BREAST: ICD-10-CM

## 2025-08-05 DIAGNOSIS — M81.0 OSTEOPOROSIS WITHOUT CURRENT PATHOLOGICAL FRACTURE, UNSPECIFIED OSTEOPOROSIS TYPE: ICD-10-CM

## 2025-08-05 DIAGNOSIS — Z00.00 ROUTINE GENERAL MEDICAL EXAMINATION AT HEALTH CARE FACILITY: Primary | ICD-10-CM

## 2025-08-05 DIAGNOSIS — R00.1 BRADYCARDIA: ICD-10-CM

## 2025-08-05 DIAGNOSIS — R93.1 ELEVATED CORONARY ARTERY CALCIUM SCORE: ICD-10-CM

## 2025-08-05 DIAGNOSIS — E55.9 VITAMIN D INSUFFICIENCY: ICD-10-CM

## 2025-08-05 LAB
BASOPHILS # BLD AUTO: 0.01 X10*3/UL (ref 0.1–1.6)
BASOPHILS NFR BLD AUTO: 0.14 % (ref 0–0.3)
EOSINOPHIL # BLD AUTO: 0.13 X10*3/UL (ref 0.04–0.5)
EOSINOPHIL NFR BLD AUTO: 2.33 % (ref 0.7–7)
ERYTHROCYTE [DISTWIDTH] IN BLOOD BY AUTOMATED COUNT: 14.5 % (ref 11.5–14.5)
HCT VFR BLD AUTO: 41.8 % (ref 36.6–46.6)
HGB BLD-MCNC: 14.09 G/DL (ref 12–15.4)
LYMPHOCYTES # BLD AUTO: 2.69 X10*3/UL (ref 0–6)
LYMPHOCYTES NFR BLD AUTO: 47.3 % (ref 20.5–51.1)
MCH RBC QN AUTO: 31.5 PG (ref 26–32)
MCHC RBC AUTO-ENTMCNC: 33.7 G/DL (ref 31–38)
MCV RBC AUTO: 93.4 FL (ref 80–96)
MONOCYTES # BLD AUTO: 0.33 X10*3/UL (ref 1.6–24.9)
MONOCYTES NFR BLD AUTO: 5.78 % (ref 1.7–9.3)
NEUTROPHILS # BLD AUTO: 2.52 X10*3/UL (ref 1.4–6.5)
NEUTROPHILS NFR BLD AUTO: 44.45 % (ref 42.2–75.2)
PLATELET # BLD AUTO: 244.9 X10*3/UL (ref 150–450)
PMV BLD AUTO: 9 FL (ref 7.8–11)
RBC # BLD AUTO: 4.48 X10*6/UL (ref 3.9–5.3)
WBC # BLD AUTO: 5.68 X10*3/UL (ref 4.5–10.5)

## 2025-08-05 ASSESSMENT — ACTIVITIES OF DAILY LIVING (ADL)
NEEDS ASSISTANCE WITH FOOD: INDEPENDENT
GROOMING: INDEPENDENT
ADEQUATE_TO_COMPLETE_ADL: YES
USING TRANSPORTATION: INDEPENDENT
DRESSING YOURSELF: INDEPENDENT
WALKS IN HOME: INDEPENDENT
PREPARING MEALS: INDEPENDENT
GROCERY SHOPPING: INDEPENDENT
TAKING MEDICATION: INDEPENDENT
TOILETING: INDEPENDENT
PATIENT'S MEMORY ADEQUATE TO SAFELY COMPLETE DAILY ACTIVITIES?: YES
JUDGMENT_ADEQUATE_SAFELY_COMPLETE_DAILY_ACTIVITIES: YES
USING TELEPHONE: INDEPENDENT
PILL BOX USED: NO
HEARING - RIGHT EAR: FUNCTIONAL
HEARING - LEFT EAR: FUNCTIONAL
EATING: INDEPENDENT
STIL DRIVING: YES
DOING HOUSEWORK: INDEPENDENT
BATHING: INDEPENDENT
MANAGING FINANCES: INDEPENDENT
FEEDING YOURSELF: INDEPENDENT

## 2025-08-05 ASSESSMENT — PATIENT HEALTH QUESTIONNAIRE - PHQ9
2. FEELING DOWN, DEPRESSED OR HOPELESS: NOT AT ALL
SUM OF ALL RESPONSES TO PHQ9 QUESTIONS 1 AND 2: 0
1. LITTLE INTEREST OR PLEASURE IN DOING THINGS: NOT AT ALL

## 2025-08-05 ASSESSMENT — ANXIETY QUESTIONNAIRES
6. BECOMING EASILY ANNOYED OR IRRITABLE: NOT AT ALL
IF YOU CHECKED OFF ANY PROBLEMS ON THIS QUESTIONNAIRE, HOW DIFFICULT HAVE THESE PROBLEMS MADE IT FOR YOU TO DO YOUR WORK, TAKE CARE OF THINGS AT HOME, OR GET ALONG WITH OTHER PEOPLE: NOT DIFFICULT AT ALL
GAD7 TOTAL SCORE: 0
4. TROUBLE RELAXING: NOT AT ALL
1. FEELING NERVOUS, ANXIOUS, OR ON EDGE: NOT AT ALL
5. BEING SO RESTLESS THAT IT IS HARD TO SIT STILL: NOT AT ALL
3. WORRYING TOO MUCH ABOUT DIFFERENT THINGS: NOT AT ALL
7. FEELING AFRAID AS IF SOMETHING AWFUL MIGHT HAPPEN: NOT AT ALL
2. NOT BEING ABLE TO STOP OR CONTROL WORRYING: NOT AT ALL

## 2025-08-05 ASSESSMENT — GERIATRIC MINI NUTRITIONAL ASSESSMENT (MNA)
D HAS SUFFERED PSYCHOLOGICAL STRESS OR ACUTE DISEASE IN THE PAST 3 MONTHS?: NO
E NEUROPSYCHOLOGICAL PROBLEMS: NO PSYCHOLOGICAL PROBLEMS
A HAS FOOD INTAKE DECLINED OVER THE PAST 3 MONTHS DUE TO LOSS OF APPETITE, DIGESTIVE PROBLEMS, CHEWING OR SWALLOWING DIFFICULTIES?: NO DECREASE IN FOOD INTAKE
B WEIGHT LOSS DURING THE LAST 3 MONTHS: NO WEIGHT LOSS
C GENERAL MOBILITY: GOES OUT

## 2025-08-05 ASSESSMENT — ENCOUNTER SYMPTOMS
LOSS OF SENSATION IN FEET: 0
OCCASIONAL FEELINGS OF UNSTEADINESS: 0
DEPRESSION: 0

## 2025-08-05 ASSESSMENT — COLUMBIA-SUICIDE SEVERITY RATING SCALE - C-SSRS
6. HAVE YOU EVER DONE ANYTHING, STARTED TO DO ANYTHING, OR PREPARED TO DO ANYTHING TO END YOUR LIFE?: NO
1. IN THE PAST MONTH, HAVE YOU WISHED YOU WERE DEAD OR WISHED YOU COULD GO TO SLEEP AND NOT WAKE UP?: NO
2. HAVE YOU ACTUALLY HAD ANY THOUGHTS OF KILLING YOURSELF?: NO

## 2025-08-05 ASSESSMENT — PAIN SCALES - GENERAL: PAINLEVEL_OUTOF10: 0-NO PAIN

## 2025-08-05 NOTE — PROGRESS NOTES
"Subjective   Reason for Visit: Naya Steiner is an 69 y.o. female here for a Medicare Wellness visit.   Reviewed all medications by prescribing practitioner or clinical pharmacist (such as prescriptions, OTCs, herbal therapies and supplements) and documented in the medical record.    HPI  Patient is here for medicare wellness doing well active  Patient Care Team:  Trell Sprague MD as PCP - General (Internal Medicine)  Trell Sprague MD as PCP - [employer] PCP     Review of Systems    Objective   Vitals:  /82   Pulse 78   Temp 36.5 °C (97.7 °F)   Resp 16   Ht (!) 1.549 m (5' 1\")   Wt 65.3 kg (144 lb)   LMP  (LMP Unknown)   BMI 27.21 kg/m²       Physical Exam    Assessment & Plan  Routine general medical examination at health care facility    Orders:    1 Year Follow Up In Primary Care - Wellness Exam; Future    Dyslipidemia    Orders:    Lipid Panel    Alanine Aminotransferase    Aspartate Aminotransferase    CBC w/5 Part Differential, [facility]    Bradycardia    Orders:    CBC w/5 Part Differential, [facility]    Elevated coronary artery calcium score         Benign essential hypertension    Orders:    Basic Metabolic Panel    CBC w/5 Part Differential, [facility]    Vitamin D insufficiency    Orders:    Vitamin D 25-Hydroxy,Total (for eval of Vitamin D levels)    Encounter for screening mammogram for malignant neoplasm of breast    Orders:    BI mammo bilateral screening tomosynthesis; Future    Cardiovascular disease reviewed with patient,  In the setting of hypertension and smoking  Which patient more than 15 discussing cardiovascular care  Including but not limited to smoking cessation  Diet and exercise  Mediterranean diet  Maintain a low-cholesterol  LDL less than 100  Patient had questions or patient were answered to patient's satisfaction  Patient declined statins  In the setting of elevated cardiac calcium score  She was informed that she is a high risk of developing heart " disease

## 2025-08-05 NOTE — ASSESSMENT & PLAN NOTE
Orders:    Lipid Panel    Alanine Aminotransferase    Aspartate Aminotransferase    CBC w/5 Part Differential, [facility]

## 2025-08-06 LAB
25(OH)D3 SERPL-MCNC: 27 NG/ML (ref 30–100)
ALT SERPL W P-5'-P-CCNC: 21 U/L (ref 16–63)
ANION GAP SERPL CALC-SCNC: 15 MMOL/L (ref 10–20)
AST SERPL W P-5'-P-CCNC: 19 U/L (ref 15–37)
BUN SERPL-MCNC: 10 MG/DL (ref 7–18)
CALCIUM SERPL-MCNC: 9.6 MG/DL (ref 8.5–10.1)
CHLORIDE SERPL-SCNC: 107 MMOL/L (ref 98–107)
CHOLEST SERPL-MCNC: 220 MG/DL (ref 0–199)
CHOLESTEROL/HDL RATIO: 3.3 (ref 4.2–7)
CO2 SERPL-SCNC: 26 MMOL/L (ref 21–32)
CREAT SERPL-MCNC: 0.73 MG/DL (ref 0.6–1.1)
EGFRCR SERPLBLD CKD-EPI 2021: 90 ML/MIN/1.73M*2
GLUCOSE SERPL-MCNC: 76 MG/DL (ref 74–100)
HDLC SERPL-MCNC: 67 MG/DL (ref 40–59)
IS PATIENT FASTING: YES
LDLC SERPL DIRECT ASSAY-MCNC: 119 MG/DL (ref 0–100)
POTASSIUM SERPL-SCNC: 3.9 MMOL/L (ref 3.5–5.1)
SODIUM SERPL-SCNC: 144 MMOL/L (ref 136–145)
TRIGL SERPL-MCNC: 144 MG/DL

## 2025-09-09 ENCOUNTER — APPOINTMENT (OUTPATIENT)
Dept: RADIOLOGY | Facility: CLINIC | Age: 70
End: 2025-09-09
Payer: MEDICARE

## 2026-07-28 ENCOUNTER — APPOINTMENT (OUTPATIENT)
Dept: PRIMARY CARE | Facility: CLINIC | Age: 71
End: 2026-07-28
Payer: MEDICARE